# Patient Record
Sex: MALE | Race: WHITE | NOT HISPANIC OR LATINO | Employment: OTHER | URBAN - METROPOLITAN AREA
[De-identification: names, ages, dates, MRNs, and addresses within clinical notes are randomized per-mention and may not be internally consistent; named-entity substitution may affect disease eponyms.]

---

## 2017-11-15 ENCOUNTER — TRANSCRIBE ORDERS (OUTPATIENT)
Dept: ADMINISTRATIVE | Facility: HOSPITAL | Age: 67
End: 2017-11-15

## 2017-11-15 DIAGNOSIS — J32.9 SINUSITIS WITH NASAL POLYPS: Primary | ICD-10-CM

## 2017-11-15 DIAGNOSIS — J33.9 SINUSITIS WITH NASAL POLYPS: Primary | ICD-10-CM

## 2017-11-21 ENCOUNTER — HOSPITAL ENCOUNTER (OUTPATIENT)
Dept: CT IMAGING | Facility: HOSPITAL | Age: 67
Discharge: HOME | End: 2017-11-21
Attending: OTOLARYNGOLOGY
Payer: COMMERCIAL

## 2017-11-21 DIAGNOSIS — J33.9 SINUSITIS WITH NASAL POLYPS: ICD-10-CM

## 2017-11-21 DIAGNOSIS — J32.9 SINUSITIS WITH NASAL POLYPS: ICD-10-CM

## 2017-11-21 PROCEDURE — 70486 CT MAXILLOFACIAL W/O DYE: CPT

## 2019-10-29 ENCOUNTER — TELEPHONE (OUTPATIENT)
Dept: NEUROLOGY | Facility: CLINIC | Age: 69
End: 2019-10-29

## 2019-10-31 ENCOUNTER — TELEPHONE (OUTPATIENT)
Dept: NEUROLOGY | Facility: CLINIC | Age: 69
End: 2019-10-31

## 2019-10-31 ENCOUNTER — OFFICE VISIT (OUTPATIENT)
Dept: NEUROLOGY | Facility: CLINIC | Age: 69
End: 2019-10-31
Payer: COMMERCIAL

## 2019-10-31 ENCOUNTER — DOCUMENTATION (OUTPATIENT)
Dept: NEUROLOGY | Facility: CLINIC | Age: 69
End: 2019-10-31

## 2019-10-31 VITALS — HEART RATE: 74 BPM | SYSTOLIC BLOOD PRESSURE: 164 MMHG | DIASTOLIC BLOOD PRESSURE: 80 MMHG

## 2019-10-31 DIAGNOSIS — Z82.0 FAMILY HISTORY OF HUNTINGTON'S DISEASE: Primary | ICD-10-CM

## 2019-10-31 PROCEDURE — 99204 OFFICE O/P NEW MOD 45 MIN: CPT | Performed by: PSYCHIATRY & NEUROLOGY

## 2019-10-31 RX ORDER — FUROSEMIDE 40 MG/1
40 TABLET ORAL DAILY PRN
COMMUNITY

## 2019-10-31 RX ORDER — MELATONIN
1000 DAILY
COMMUNITY

## 2019-10-31 RX ORDER — AMOXICILLIN 500 MG
CAPSULE ORAL DAILY
COMMUNITY

## 2019-10-31 NOTE — TELEPHONE ENCOUNTER
Forms printed, patient's insurance card and office note has printed as well  Will await confirmation from [employer] Rep to either fax or e-mail the form directly to him

## 2019-10-31 NOTE — TELEPHONE ENCOUNTER
Patient signed [facility] paperwork  Scanned forms under media and will email a copy to you, Demarco Ma  Let me know if I need to reach out to the patient with any more information  Will await phone call from patient after blood work to schedule him a follow up with Dr Diego Og

## 2019-10-31 NOTE — PROGRESS NOTES
Assessment/Plan:    Family history of Pope's disease   79-year-old man with diabetes, hypertension and arthritis presents due to a family history of Pope's disease  He is asymptomatic  He does have some gait dysfunction which appears to be related to his body habitus and arthritis/MSK issues  We discussed Pope's disease in general and details regarding genetic testing  He is interested in moving forward with HD genetic testing  Will return with his wife to review the results in person  I explained that we do no give out the results of genetic testing by phone  Diagnoses and all orders for this visit:    Family history of Pope's disease    Other orders  -     Omega-3 Fatty Acids (FISH OIL) 1200 MG CAPS; Take by mouth daily  -     cholecalciferol (VITAMIN D3) 1,000 units tablet; Take 1,000 Units by mouth daily  -     glucose blood test strip; 1 each by Other route as needed Use as instructed  -     furosemide (LASIX) 40 mg tablet; Take 40 mg by mouth daily as needed  -     fluticasone (ARNUITY ELLIPTA) 100 MCG/ACT AEPB inhaler; Inhale 1 puff daily Rinse mouth after use  Subjective:     Patient ID: Ruben Cho is a 78 y o  male  I had the pleasure of seeing your patient, Ruben Cho in the 2020 [facility] at the [facility] [facility]  As you know, Ruben Cho is a 78y o  year old man with diabetes and hypertension who presents given a family history of Pope's disease  The patient's father was diagnosed with Ramon's disease at age 80 and passed away at age 80  There apparently was some disagreement among his doctors about this diagnosis and the family didn't really deal with it until his brother was recently diagnosed at age 61  He is currently following at [facility]  The patient suffered a PE recently and this prompted his interest in getting the HD genetic testing done  The patient is the oldest of 5 siblings   His brother is now 61  One of his sisters was recently tested and is gene negative  He has 3 kids of his own and 8 grand kids with a 9th on the way  No one in that generation have been tested  The patient's main support is his wife who he will bring to our follow up visit  We discussed his plans should his testing come back positive and should it come back negative  We discussed his interest in getting tested which is mostly for his children  Abnormal movements (resulting injuries): no   Gait difficulties: fell on a cruise  Tripped  bl knee arthritis  Swallowing: no   Weight loss: no   Psychiatric: no   Cognitive issues: no      Retired   at [employer] and then at [employer]  Functional Capacity:  Occupation   0=unable   1=marginal work only   2=reduced capacity for usual job   x 3=normal    Finances   0=unable   1=major assistance   2=minor assistance   x 3=normal    Domestic chores   0=unable   1=impaired  X  2=normal    ADL   0=total care   1=gross tasks only   2=minimal impairment  X  3=normal    Care Level   0=full time skilled nursing   1=home or chronic care  X  2=home      The following portions of the patient's history were reviewed and updated as appropriate: allergies, current medications, past family history, past medical history, past social history, past surgical history and problem list       Objective:  /80 (BP Location: Right arm, Patient Position: Sitting, Cuff Size: Standard)   Pulse 74     Physical Exam    Neurological Exam     GENERAL MEDICAL EXAMINATION:  General appearance: alert, in no apparent distress  Appropriately dressed and groomed  Conversing and interacting appropriately  Eyes: Sclera are non-injected  Ears, nose, Mouth, Throat: Mucous membranes are moist    Resp: Breathing comfortably on RA   Musculoskeletal: No evidence of deformities  No contractures  No Edema  Skin: No visible rashes  Warm and well perfused    Psych: normal and appropriate affect Mental Status:  Alert and oriented to person place and time  Able to relate history without difficulty  Attentive to conversation  Language is fluent and appropriate with normal prosody  There were no paraphasic errors  Speech was not dysarthric  Able to follow both midline and appendicular commands  General Neurology examination:   PERRL, EOMI, face activates symmetrically  Normal bulk and tone throughout  Patient moves all 4 extremities equally and antigravity  No pronator drift  There were no adventitious movements noted  Normal sensation to light touch and temperature in all 4 extremities  Finger to nose without dysmetria bilaterally  No intention tremor  ZOE were accurate and symmetric with regard to crescencio and speed  Gait: Needs to use his arms in order to stand  Posture ws normal  Narrow based and stable stand  Gait is antalgic with   A trendelenburg component  Attempting tandem gait cause knee and hip pain       UHDRS:   * Ocular Pursuit (horizontal)  X 0-complete  1-jerky  2-interrupted/full range  3-incomplete range  4-cannot pursue    * Ocular Pursuit (vertical)  X 0-complete  1-jerky  2-interrupted/full range  3-incomplete range  4-cannot pursue    * Saccade Initiation (horizontal)  X 0-normal  1-increased latency  2-suppressible blinks/head movements to  initiate  3-unsuppressible head movements  4-cannot initiate    * Saccade Initiation (vertical)  X 0-normal  1-increased latency  2-suppressible blinks/head movements to  initiate  3-unsuppressible head movements  4-cannot initiate    * Saccade Velocity (horizontal)  X 0-normal  1-mild slowing  2-moderate slowing  3-severely slow, full range  4-incomplete range    * Saccade Velocity (vertical)  X 0-normal  1-mild slowing  2-moderate slowing  3-severely slow, full range  4-incomplete range    * Dysarthria  X 0-normal  1-unclear, no need to repeat  2-must repeat  3-mostly incomprehensible  4-mute    * Tongue Protrusion  X 0-normal  1-<10 seconds  2-<5 seconds  3-cannot fully protrude  4-cannot beyond lips    * Finger Taps (right)  X 0-normal (15/5sec)  1-mild slowing or reduction in amp  2-moderately impaired  may have occasional  arrests (7- 10/15sec)  3-severely impaired  Frequent hesitations and  arrests  4-can barely perform    * Finger Taps (left)  X 0-normal (15/5sec)  1-mild slowing or reduction in amp  2-moderately impaired  may have occasional  arrests (7- 10/15sec)  3-severely impaired   Frequent hesitations and  arrests  4-can barely perform    * Pronate/Supinate (right)  X 0-normal  1-mild slowing/irregular  2-moderate slowing and irregular  3-severe slowing and irregular  4-cannot perform    * Pronate/Supinate (left)  X 0-normal  1-mild slowing/irregular  2-moderate slowing and irregular  3-severe slowing and irregular  4-cannot perform    * Fist-Hand-Palm Sequence  X 0->4 in 10 seconds without cues  1-<4 in 10 sec  without cues  2->4 in 10 sec  with cues  3-<4 in 10 sec  with cues  4-cannot perform    * Rigidity-arms (right)  X 0-absent  1-slight or only with activation  2-mild/moderate  3-severe, full range of motion  4-severe with limited range    * Rigidity-arms (left)  X 0-absent  1-slight or only with activation  2-mild/moderate  3-severe, full range of motion  4-severe with limited range    * Bradykinesia  X 0-normal  1-minimally slow  2-mildly but clearly slow  3-moderately slow  4-marked slowing, long delays in initiation    * Maximal Dystonia(trunk)  X 0-absent  1-slight/intermittent  2-mild/common or moderate/intermittent  3-moderate/common  4-marked/prolonged    * Maximal Dystonia(RUE)  X 0-absent  1-slight/intermittent  2-mild/common or moderate/intermittent  3-moderate/common  4-marked/prolonged    * Maximal Dystonia(LUE)  X 0-absent  1-slight/intermittent  2-mild/common or moderate/intermittent  3-moderate/common  4-marked/prolonged    * Maximal Dystonia(RLE)  X 0-absent  1-slight/intermittent  2-mild/common or moderate/intermittent  3-moderate/common  4-marked/prolonged    * Maximal Dystonia(LLE)  X 0-absent  1-slight/intermittent  2-mild/common or moderate/intermittent  3-moderate/common  4-marked/prolonged    * Maximal Chorea (Face)  X 0-absent  1-slight/intermittent  2-mild/common or moderate/intermittent  3-moderate/common  4-marked/prolonged    * Maximal Chorea (DAYRON)  X 0-absent  1-slight/intermittent  2-mild/common or moderate/intermittent  3-moderate/common  4-marked/prolonged    * Maximal Chorea (Trunk)  X 0-absent  1-slight/intermittent  2-mild/common or moderate/intermittent  3-moderate/common  4-marked/prolonged    * Maximal Chorea (RUE)  X 0-absent  1-slight/intermittent  2-mild/common or moderate/intermittent  3-moderate/common  4-marked/prolonged    * Maximal Chorea (LUE)  X 0-absent  1-slight/intermittent  2-mild/common or moderate/intermittent  3-moderate/common  4-marked/prolonged    * Maximal Chorea (LLE)  X 0-absent  1-slight/intermittent  2-mild/common or moderate/intermittent  3-moderate/common  4-marked/prolonged    * Maximal Chorea (RLE)  X 0-absent  1-slight/intermittent  2-mild/common or moderate/intermittent  3-moderate/common  4-marked/prolonged    * Gait  X 0-normal narrow base  1-wide base, and/or slow  2-wide base, walks with difficulty  3-walks with assistance  4-cannot attempt    * Tandem Walking  0-normal for 10 steps  1-1-3 deviations  2->3 deviations  3-cannot complete  X 4-cannot attempt    * Retropulsion: not tested due to MSK issues   0-normal  1-recovers spontaneously  2-would fall if not caught  3-falls spontaneously  4-cannot stand        Review of Systems   Constitutional: Negative  Negative for appetite change and fever  HENT: Positive for sinus pressure and sinus pain  Negative for hearing loss, tinnitus, trouble swallowing and voice change  Eyes: Negative  Negative for photophobia and pain  Respiratory: Negative  Negative for shortness of breath      Cardiovascular: Negative  Negative for palpitations  Gastrointestinal: Negative  Negative for nausea and vomiting  Endocrine: Negative  Negative for cold intolerance and heat intolerance  Genitourinary: Negative  Negative for dysuria, frequency and urgency  Musculoskeletal: Positive for gait problem (pain while walking)  Negative for myalgias and neck pain  Skin: Negative  Negative for rash  Neurological: Negative for dizziness, tremors, seizures, syncope, facial asymmetry, speech difficulty, weakness, light-headedness, numbness and headaches  Hematological: Negative  Does not bruise/bleed easily  Psychiatric/Behavioral: Positive for sleep disturbance (snoring)  Negative for confusion and hallucinations  The above ROS was reviewed and updated  Jazmyn Scott MD  Medical Director   [facility]  [facility]       Current Outpatient Medications on File Prior to Visit   Medication Sig Dispense Refill    allopurinol (ZYLOPRIM) 300 mg tablet Take 300 mg by mouth      apixaban (ELIQUIS) 5 mg Take 5 mg by mouth 2 (two) times a day      benazepril (LOTENSIN) 40 MG tablet Take 40 mg by mouth daily      carvedilol (COREG CR) 40 MG 24 hr capsule Take 40 mg by mouth daily      cholecalciferol (VITAMIN D3) 1,000 units tablet Take 1,000 Units by mouth daily      fexofenadine (ALLEGRA) 180 MG tablet Take 180 mg by mouth      fluticasone (ARNUITY ELLIPTA) 100 MCG/ACT AEPB inhaler Inhale 1 puff daily Rinse mouth after use        fluticasone (FLONASE) 50 mcg/act nasal spray 2 sprays into each nostril      furosemide (LASIX) 40 mg tablet Take 40 mg by mouth daily as needed      glucose blood test strip 1 each by Other route as needed Use as instructed      glyBURIDE (DIABETA) 5 mg tablet Take 5 mg by mouth 2 (two) times a day      metFORMIN (GLUCOPHAGE) 1000 MG tablet Take 1,000 mg by mouth 2 (two) times a day      montelukast (SINGULAIR) 10 mg tablet Take 10 mg by mouth      Omega-3 Fatty Acids (FISH OIL) 1200 MG CAPS Take by mouth daily      simvastatin (ZOCOR) 10 mg tablet Take 10 mg by mouth      albuterol (VENTOLIN HFA) 90 mcg/act inhaler Inhale 1 puff every 6 (six) hours as needed      aspirin 81 mg chewable tablet Chew 81 mg      tamsulosin (FLOMAX) 0 4 mg Take 0 4 mg by mouth       No current facility-administered medications on file prior to visit

## 2019-10-31 NOTE — ASSESSMENT & PLAN NOTE
74-year-old man with diabetes, hypertension and arthritis presents due to a family history of Ramon's disease  He is asymptomatic  He does have some gait dysfunction which appears to be related to his body habitus and arthritis/MSK issues  We discussed Bluefield's disease in general and details regarding genetic testing  He is interested in moving forward with HD genetic testing  Will return with his wife to review the results in person  I explained that we do no give out the results of genetic testing by phone

## 2019-11-11 NOTE — TELEPHONE ENCOUNTER
E-mail sent to Mike Ray, genetic testing rep to follow up on this request and to see if there are any updates

## 2019-11-12 NOTE — TELEPHONE ENCOUNTER
Per Misael Plater: patient's benefits were confirmed and patient has a co-pay of $75 00 OOP  Patient is agreeable to co-pay and kit was mailed to the patient's home for collection   He states he will await it's return ahead of processing the test

## 2019-11-15 NOTE — TELEPHONE ENCOUNTER
Scheduled patient on 12/19 to review results  Rep  from [address] told patient it would be a 4-5 week turnaround  Let patient know if we do not have the results by his scheduled appt that I will give him a call and reschedule him

## 2019-12-03 ENCOUNTER — TELEPHONE (OUTPATIENT)
Dept: NEUROLOGY | Facility: CLINIC | Age: 69
End: 2019-12-03

## 2019-12-03 NOTE — TELEPHONE ENCOUNTER
Luis E Report received and scanned into the patient's chart under "media" for you to review  Please let me know if you need anything additional     Thank you!     Lavonne Machado

## 2019-12-03 NOTE — TELEPHONE ENCOUNTER
Thanks  That worked out well  Mercy can we see if he would like to come in for a sooner appointment as the results have returned  We could double book on Friday with one of the resident slots  Not sure if I have any opening otherwise on Th or early next week

## 2019-12-05 ENCOUNTER — OFFICE VISIT (OUTPATIENT)
Dept: NEUROLOGY | Facility: CLINIC | Age: 69
End: 2019-12-05
Payer: COMMERCIAL

## 2019-12-05 VITALS
WEIGHT: 311 LBS | HEART RATE: 72 BPM | BODY MASS INDEX: 36.72 KG/M2 | SYSTOLIC BLOOD PRESSURE: 140 MMHG | HEIGHT: 77 IN | DIASTOLIC BLOOD PRESSURE: 90 MMHG

## 2019-12-05 DIAGNOSIS — Z82.0 FAMILY HISTORY OF HUNTINGTON'S DISEASE: Primary | ICD-10-CM

## 2019-12-05 PROCEDURE — 99214 OFFICE O/P EST MOD 30 MIN: CPT | Performed by: PSYCHIATRY & NEUROLOGY

## 2019-12-05 RX ORDER — DOXYCYCLINE HYCLATE 100 MG/1
CAPSULE ORAL
COMMUNITY
Start: 2019-12-03

## 2019-12-05 RX ORDER — CHLORTHALIDONE 25 MG/1
TABLET ORAL DAILY
COMMUNITY

## 2019-12-05 NOTE — PROGRESS NOTES
Assessment/Plan:    Family history of Dakota's disease  The patient's HD testing came back negative, he is not at risk of getting Dakota's disease  I have spent 30 minutes with Patient and family today in which greater than 50% of this time was spent in counseling/coordination of care regarding Diagnostic results, Prognosis, Patient and family education and Impressions  We reviewed the genetic testing in detail and discussed various aspects of the results and implications for him and other specific family members  At this point we will not set up a follow-up visit  I encouraged him to reach out to me if I could be of any assistance including seeing any of his children, with or without the patient  I am also happy to talk with them by phone her with their healthcare team       Diagnoses and all orders for this visit:    Family history of Ramon's disease    Other orders  -     doxycycline hyclate (VIBRAMYCIN) 100 mg capsule  -     chlorthalidone 25 mg tablet; Daily        Subjective:     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  I had the pleasure of seeing your patient, Colonel Evans in the 2020 [facility] at the [facility]  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  year old man with diabetes and hypertension who presents given a family history of Dakota's disease  The patient denies any changes in his health or symptoms since he was last here  He presents with his wife who I have not met before  We reviewed his genetic testing results         The following portions of the patient's history were reviewed and updated as appropriate: allergies, current medications, past family history, past medical history, past social history, past surgical history and problem list       Objective:  /90 (BP Location: Left arm, Patient Position: Sitting, Cuff Size: Standard)   Pulse 72   Ht 6' 5" (1 956 m)   Wt (!) 141 kg (311 lb)   BMI 36 88 kg/m²     Physical Exam    Neurological Exam    GENERAL MEDICAL EXAMINATION:  General appearance: alert, in no apparent distress  Appropriately dressed and groomed  Conversing and interacting appropriately  Eyes: Sclera are non-injected  Ears, nose, Mouth, Throat: Mucous membranes are moist    Resp: Breathing comfortably on RA   Musculoskeletal: No evidence of deformities  No contractures  No Edema  Skin: No visible rashes  Warm and well perfused  Psych: normal and appropriate affect     Mental Status:  Alert and oriented to person place and time  Able to relate history without difficulty  Attentive to conversation  Language is fluent and appropriate with normal prosody  There were no paraphasic errors  Speech was not dysarthric  Able to follow both midline and appendicular commands  General Neurology examination:    Face activates symmetrically  Normal bulk and tone throughout  Patient moves all 4 extremities equally and antigravity  There were no adventitious movements noted  Review of Systems   Constitutional: Negative  Negative for appetite change and fever  HENT: Positive for sinus pressure and sinus pain  Negative for hearing loss, tinnitus, trouble swallowing and voice change  Eyes: Negative  Negative for photophobia and pain  Respiratory: Positive for cough  Negative for shortness of breath  Cardiovascular: Negative  Negative for palpitations  Gastrointestinal: Negative  Negative for nausea and vomiting  Endocrine: Negative  Negative for cold intolerance and heat intolerance  Genitourinary: Negative  Negative for dysuria, frequency and urgency  Musculoskeletal: Negative  Negative for myalgias and neck pain  Skin: Negative  Negative for rash  Neurological: Negative  Negative for dizziness, tremors, seizures, syncope, facial asymmetry, speech difficulty, weakness, light-headedness, numbness and headaches  Hematological: Negative  Does not bruise/bleed easily  Psychiatric/Behavioral: Negative  Negative for confusion, hallucinations and sleep disturbance  Current Outpatient Medications on File Prior to Visit   Medication Sig Dispense Refill    allopurinol (ZYLOPRIM) 300 mg tablet Take 300 mg by mouth      apixaban (ELIQUIS) 5 mg Take 5 mg by mouth 2 (two) times a day      benazepril (LOTENSIN) 40 MG tablet Take 40 mg by mouth daily      carvedilol (COREG CR) 40 MG 24 hr capsule Take 40 mg by mouth daily      chlorthalidone 25 mg tablet Daily      cholecalciferol (VITAMIN D3) 1,000 units tablet Take 1,000 Units by mouth daily      doxycycline hyclate (VIBRAMYCIN) 100 mg capsule       fexofenadine (ALLEGRA) 180 MG tablet Take 180 mg by mouth      fluticasone (FLONASE) 50 mcg/act nasal spray 2 sprays into each nostril      furosemide (LASIX) 40 mg tablet Take 40 mg by mouth daily as needed      glucose blood test strip 1 each by Other route as needed Use as instructed      glyBURIDE (DIABETA) 5 mg tablet Take 5 mg by mouth 2 (two) times a day      metFORMIN (GLUCOPHAGE) 1000 MG tablet Take 1,000 mg by mouth 2 (two) times a day      montelukast (SINGULAIR) 10 mg tablet Take 10 mg by mouth      Omega-3 Fatty Acids (FISH OIL) 1200 MG CAPS Take by mouth daily      simvastatin (ZOCOR) 10 mg tablet Take 10 mg by mouth      albuterol (VENTOLIN HFA) 90 mcg/act inhaler Inhale 1 puff every 6 (six) hours as needed      aspirin 81 mg chewable tablet Chew 81 mg      fluticasone (ARNUITY ELLIPTA) 100 MCG/ACT AEPB inhaler Inhale 1 puff daily Rinse mouth after use   tamsulosin (FLOMAX) 0 4 mg Take 0 4 mg by mouth       No current facility-administered medications on file prior to visit

## 2019-12-05 NOTE — ASSESSMENT & PLAN NOTE
The patient's HD testing came back negative, he is not at risk of getting Victoria's disease  I have spent 30 minutes with Patient and family today in which greater than 50% of this time was spent in counseling/coordination of care regarding Diagnostic results, Prognosis, Patient and family education and Impressions  We reviewed the genetic testing in detail and discussed various aspects of the results and implications for him and other specific family members  At this point we will not set up a follow-up visit  I encouraged him to reach out to me if I could be of any assistance including seeing any of his children, with or without the patient    I am also happy to talk with them by phone her with their healthcare team

## 2021-03-02 DIAGNOSIS — Z23 ENCOUNTER FOR IMMUNIZATION: ICD-10-CM

## 2021-07-12 PROBLEM — J34.2 DNS (DEVIATED NASAL SEPTUM): Status: ACTIVE | Noted: 2021-07-12

## 2021-07-12 PROBLEM — J33.8 MULTIPLE POLYPS OF ETHMOID SINUS: Status: ACTIVE | Noted: 2021-07-12

## 2021-07-12 PROBLEM — J30.9 ALLERGIC RHINITIS: Status: ACTIVE | Noted: 2021-07-12
